# Patient Record
Sex: MALE | Race: WHITE
[De-identification: names, ages, dates, MRNs, and addresses within clinical notes are randomized per-mention and may not be internally consistent; named-entity substitution may affect disease eponyms.]

---

## 2018-01-12 ENCOUNTER — HOSPITAL ENCOUNTER (EMERGENCY)
Dept: HOSPITAL 17 - PHEFT | Age: 38
Discharge: HOME | End: 2018-01-12
Payer: COMMERCIAL

## 2018-01-12 VITALS
TEMPERATURE: 98.8 F | RESPIRATION RATE: 18 BRPM | DIASTOLIC BLOOD PRESSURE: 87 MMHG | SYSTOLIC BLOOD PRESSURE: 159 MMHG | HEART RATE: 97 BPM | OXYGEN SATURATION: 98 %

## 2018-01-12 VITALS — BODY MASS INDEX: 36.73 KG/M2 | HEIGHT: 72 IN | WEIGHT: 271.17 LBS

## 2018-01-12 DIAGNOSIS — K04.7: Primary | ICD-10-CM

## 2018-01-12 DIAGNOSIS — E11.9: ICD-10-CM

## 2018-01-12 PROCEDURE — 99283 EMERGENCY DEPT VISIT LOW MDM: CPT

## 2018-01-12 NOTE — PD
HPI


Chief Complaint:  Oral / Dental Pain or Problem


Time Seen by Provider:  10:32


Travel History


International Travel<30 days:  No


Contact w/Intl Traveler<30days:  No


Traveled to known affect area:  No





History of Present Illness


HPI


38 -year-old male with left dental pain and facial swelling 3 days.  He has a 

dental appointment scheduled for Monday.  He denies fever or chills.  Symptom 

severity is moderate.  Aggravated by palpation of the lower jaw and slightly 

alleviated with OTC Tylenol.  He denies difficulty eating or drinking.





PFSH


Past Medical History


Diabetes:  Yes (control with diet)


Patient Takes Glucophage:  No





Past Surgical History


Surgical History:  No Previous Surgery





Social History


Alcohol Use:  No


Tobacco Use:  No


Substance Use:  No





Allergies-Medications


(Allergen,Severity, Reaction):  


Coded Allergies:  


     No Known Allergies (Verified  Allergy, Unknown, 1/12/18)


Reported Meds & Prescriptions





Reported Meds & Active Scripts


Active


No Active Prescriptions or Reported Medications    








Review of Systems


Except as stated in HPI:  all other systems reviewed are Neg


General / Constitutional:  No: Fever


Eyes:  No: Visual changes


HENT:  No: Headaches


Cardiovascular:  No: Chest Pain or Discomfort


Respiratory:  No: Shortness of Breath


Gastrointestinal:  No: Abdominal Pain


Genitourinary:  No: Dysuria





Physical Exam


Narrative


GENERAL: Alert and well-appearing male in no distress.


SKIN: Warm and dry.


HEAD: Normocephalic.  Left lower facial swelling which does not extend to the 

neck.


EYES: No injection or drainage. 


MOUTH: Widespread dental decay.  Left lower premolars decayed with surrounding, 

erythema.  No discernible abscess.  No swelling of the floor the mouth.  Uvula 

is midline.  Airway is patent.


NECK: Supple, trachea midline. No JVD or lymphadenopathy.


CARDIOVASCULAR: Regular rate and rhythm 


RESPIRATORY: Breath sounds equal bilaterally. No accessory muscle use.


GASTROINTESTINAL: Abdomen soft, non-tender, nondistended. 


MUSCULOSKELETAL: No cyanosis, or edema. 


BACK: Nontender without obvious deformity. No CVA tenderness.








Data


Data


Last Documented VS





Vital Signs








  Date Time  Temp Pulse Resp B/P (MAP) Pulse Ox O2 Delivery O2 Flow Rate FiO2


 


1/12/18 10:21 98.8 97 18 159/87 (111) 98   











MDM


Medical Decision Making


Medical Screen Exam Complete:  Yes


Emergency Medical Condition:  Yes


Differential Diagnosis


Dental abscess, dental caries, periodontal disease


Narrative Course


38-year-old male here with dental abscess to the left lower premolar region.  

His airway is patent.  He is nontoxic appearing.  Patient will be put on 

clindamycin and instructed to follow-up with his dentist on Monday.





Diagnosis





 Primary Impression:  


 Dental abscess


Referrals:  


Dentist





***Additional Instructions:  


Antibiotics as prescribed.


Ibuprofen as needed for pain.


Follow-up with her dentist on Monday.


Stay well hydrated.


Scripts


Ibuprofen (Ibuprofen) 800 Mg Tab


800 MG PO Q6HR Y for PAIN, #40 TAB 0 Refills


   Prov: Loli Landaverde         1/12/18 


Clindamycin (Clindamycin) 300 Mg Cap


300 MG PO Q6H for Infection for 10 Days, #40 CAP 0 Refills


   Prov: Loli Landaverde         1/12/18


Disposition:  01 DISCHARGE HOME


Condition:  Stable











Loli Landaverde Jan 12, 2018 11:36